# Patient Record
Sex: MALE | Race: WHITE | Employment: PART TIME | URBAN - METROPOLITAN AREA
[De-identification: names, ages, dates, MRNs, and addresses within clinical notes are randomized per-mention and may not be internally consistent; named-entity substitution may affect disease eponyms.]

---
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
## 2022-08-15 ENCOUNTER — HOSPITAL ENCOUNTER (EMERGENCY)
Age: 22
Discharge: HOME | End: 2022-08-15
Payer: COMMERCIAL

## 2022-08-15 VITALS
OXYGEN SATURATION: 100 % | WEIGHT: 200 LBS | SYSTOLIC BLOOD PRESSURE: 158 MMHG | HEART RATE: 103 BPM | TEMPERATURE: 101.1 F | RESPIRATION RATE: 18 BRPM | HEIGHT: 71 IN | BODY MASS INDEX: 28 KG/M2 | DIASTOLIC BLOOD PRESSURE: 90 MMHG

## 2022-08-15 DIAGNOSIS — B27.90 INFECTIOUS MONONUCLEOSIS WITHOUT COMPLICATION, INFECTIOUS MONONUCLEOSIS DUE TO UNSPECIFIED ORGANISM: Primary | ICD-10-CM

## 2022-08-15 LAB
MONO TEST: POSITIVE
SARS-COV-2, NAAT: NOT DETECTED
STREP GRP A PCR: NEGATIVE

## 2022-08-15 PROCEDURE — 36415 COLL VENOUS BLD VENIPUNCTURE: CPT

## 2022-08-15 PROCEDURE — 87880 STREP A ASSAY W/OPTIC: CPT

## 2022-08-15 PROCEDURE — 6370000000 HC RX 637 (ALT 250 FOR IP): Performed by: NURSE PRACTITIONER

## 2022-08-15 PROCEDURE — 99211 OFF/OP EST MAY X REQ PHY/QHP: CPT

## 2022-08-15 PROCEDURE — 86308 HETEROPHILE ANTIBODY SCREEN: CPT

## 2022-08-15 PROCEDURE — 87635 SARS-COV-2 COVID-19 AMP PRB: CPT

## 2022-08-15 RX ORDER — ACETAMINOPHEN 325 MG/1
650 TABLET ORAL EVERY 6 HOURS PRN
COMMUNITY

## 2022-08-15 RX ADMIN — IBUPROFEN 600 MG: 200 TABLET, FILM COATED ORAL at 19:33

## 2022-08-15 ASSESSMENT — PAIN DESCRIPTION - PAIN TYPE
TYPE: ACUTE PAIN
TYPE: ACUTE PAIN

## 2022-08-15 ASSESSMENT — PAIN DESCRIPTION - FREQUENCY
FREQUENCY: CONTINUOUS
FREQUENCY: CONTINUOUS

## 2022-08-15 ASSESSMENT — PAIN SCALES - GENERAL
PAINLEVEL_OUTOF10: 7
PAINLEVEL_OUTOF10: 7
PAINLEVEL_OUTOF10: 5

## 2022-08-15 ASSESSMENT — PAIN DESCRIPTION - LOCATION
LOCATION: THROAT

## 2022-08-15 ASSESSMENT — PAIN DESCRIPTION - DESCRIPTORS
DESCRIPTORS: SORE
DESCRIPTORS: SORE

## 2022-08-15 ASSESSMENT — PAIN DESCRIPTION - ONSET
ONSET: GRADUAL
ONSET: GRADUAL

## 2022-08-15 ASSESSMENT — PAIN - FUNCTIONAL ASSESSMENT: PAIN_FUNCTIONAL_ASSESSMENT: 0-10

## 2022-08-15 NOTE — Clinical Note
Jeni Kerrie was seen and treated in our emergency department on 8/15/2022.     He will be absent from work until cleared by his Dfr    [place] Pamela, APRN - CNP

## 2022-08-15 NOTE — Clinical Note
Mateo Early was seen and treated in our emergency department on 8/15/2022.         Delfin Mishra, ALBERTO - CNP

## 2022-08-16 NOTE — ED PROVIDER NOTES
Department of Emergency . [facility]  Provider Note  Admit Date/RoomTime: 8/15/2022  7:09 PM  Room:   NAME: Tony Haque  : 2000  MRN: [id number]     Chief Complaint:  Pharyngitis, Nasal Congestion, and Cough    History of Present Illness       Tony Haque is a 25 y.o. old male who presents to the urgent care center by private vehicle for evaluation. He has a sore throat, cough, nasal congestion, and body aches. This started yesterday. He has not been around anyone sick. He is not complaining of abdominal pain, chest pain, or shortness of breath. ROS    Pertinent positives and negatives are stated within HPI, all other systems reviewed and are negative. Past Surgical History:   Procedure Laterality Date    KNEE SURGERY     Social History:  reports that he has never smoked. He has never used smokeless tobacco. He reports current alcohol use. He reports that he does not use drugs. Family History: family history is not on file. Allergies: Patient has no known allergies. Physical Exam            ED Triage Vitals [08/15/22 1910]   BP Temp Temp Source Heart Rate Resp SpO2 Height Weight   (!) 158/90 (!) 102.2 °F (39 °C) Infrared (!) 105 18 100 % 5' 11\" (1.803 m) 200 lb (90.7 kg)      Oxygen Saturation Interpretation: Normal.    Constitutional:  Alert, appears ill  Ears:  External Ears: Bilateral normal.               TM's & External Canals: normal appearance, normal TMs bilaterally. Nose:   There is no discharge, swelling or lesions noted. Sinuses: no bilateral maxillary sinus tenderness. no bilateral frontal sinus tenderness. Mouth:  normal tongue and buccal mucosa. Throat: mild erythema. Airway patent. Neck/Lymphatics:  Neck Supple. There is Bilateral  anterior cervical node tenderness. Respiratory:   Breath sounds: bilateral normal, diminished.   Lung sounds: normal.   CV:  Regular rate and rhythm, normal heart sounds, without pathological murmurs, ectopy, gallops, or rubs. GI:  Abdomen Soft, nontender, good bowel sounds. No firm or pulsatile mass. Integument:  Normal turgor. Warm, dry, without visible rash. Neurological:  Oriented. Motor functions intact. Lab / Imaging Results   (All laboratory and radiology results have been personally reviewed by myself)  Labs:  Results for orders placed or performed during the hospital encounter of 08/15/22   Strep Screen Group A Throat    Specimen: Throat   Result Value Ref Range    Strep Grp A PCR Negative Negative   COVID-19, Rapid    Specimen: Nasopharyngeal Swab   Result Value Ref Range    SARS-CoV-2, NAAT Not Detected Not Detected   Mononucleosis Screen   Result Value Ref Range    Mono Test POSITIVE (A) Negative     Imaging: All Radiology results interpreted by Radiologist unless otherwise noted. No orders to display     ED Course / Medical Decision Making     Medications   ibuprofen (ADVIL;MOTRIN) tablet 600 mg (600 mg Oral Given 8/15/22 1933)          MDM:     He was give ibuprofen for his temp--it was 102.2   COvid and strep were negative. He is positive for Mono. He as no abdominal pain or tenderness. Temp recheck was 101.1  I discussed symptomatic treatment for the mono. He was advised to alternate tylenol an dibuprofen for the fever. If abdominal pain he should go directly to the ED. He was given a note to be off work until he was cleared by his Dr. 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   1. Infectious mononucleosis without complication, infectious mononucleosis due to unspecified organism      Plan   Discharge to home and advised to contact María Chadwick MD  [address] [place] [address]  [phone]    Schedule an appointment as soon as possible for a visit      Patient condition is good    New Medications     Discharge Medication List as of 8/15/2022  8:29 PM        Electronically signed by ALBERTO Duarte CNP   DD: 8/16/22  **This report was transcribed using voice recognition software. Every effort was made to ensure accuracy; however, inadvertent computerized transcription errors may be present.   END OF ED PROVIDER NOTE      ALBERTO Melchor - LUIS  08/16/22 [id number]

## 2024-04-02 ENCOUNTER — APPOINTMENT (OUTPATIENT)
Dept: GENERAL RADIOLOGY | Age: 24
End: 2024-04-02
Payer: COMMERCIAL

## 2024-04-02 ENCOUNTER — HOSPITAL ENCOUNTER (EMERGENCY)
Age: 24
Discharge: HOME | End: 2024-04-02
Attending: STUDENT IN AN ORGANIZED HEALTH CARE EDUCATION/TRAINING PROGRAM
Payer: COMMERCIAL

## 2024-04-02 VITALS
OXYGEN SATURATION: 99 % | DIASTOLIC BLOOD PRESSURE: 87 MMHG | WEIGHT: 220 LBS | RESPIRATION RATE: 20 BRPM | SYSTOLIC BLOOD PRESSURE: 135 MMHG | BODY MASS INDEX: 30.68 KG/M2 | TEMPERATURE: 97.7 F | HEART RATE: 90 BPM

## 2024-04-02 DIAGNOSIS — M25.562 ACUTE PAIN OF LEFT KNEE: Primary | ICD-10-CM

## 2024-04-02 PROCEDURE — 96372 THER/PROPH/DIAG INJ SC/IM: CPT

## 2024-04-02 PROCEDURE — 99284 EMERGENCY DEPT VISIT MOD MDM: CPT

## 2024-04-02 PROCEDURE — 6360000002 HC RX W HCPCS

## 2024-04-02 PROCEDURE — 73560 X-RAY EXAM OF KNEE 1 OR 2: CPT

## 2024-04-02 PROCEDURE — 6370000000 HC RX 637 (ALT 250 FOR IP)

## 2024-04-02 RX ORDER — OXYCODONE HYDROCHLORIDE AND ACETAMINOPHEN 5; 325 MG/1; MG/1
1 TABLET ORAL ONCE
Status: COMPLETED | OUTPATIENT
Start: 2024-04-02 | End: 2024-04-02

## 2024-04-02 RX ORDER — OXYCODONE HYDROCHLORIDE AND ACETAMINOPHEN 5; 325 MG/1; MG/1
1 TABLET ORAL EVERY 6 HOURS PRN
Qty: 8 TABLET | Refills: 0 | Status: SHIPPED | OUTPATIENT
Start: 2024-04-02 | End: 2024-04-04

## 2024-04-02 RX ORDER — KETOROLAC TROMETHAMINE 30 MG/ML
30 INJECTION, SOLUTION INTRAMUSCULAR; INTRAVENOUS ONCE
Status: COMPLETED | OUTPATIENT
Start: 2024-04-02 | End: 2024-04-02

## 2024-04-02 RX ADMIN — OXYCODONE AND ACETAMINOPHEN 1 TABLET: 5; 325 TABLET ORAL at 21:52

## 2024-04-02 RX ADMIN — KETOROLAC TROMETHAMINE 30 MG: 30 INJECTION INTRAMUSCULAR; INTRAVENOUS at 21:52

## 2024-04-02 ASSESSMENT — PAIN SCALES - GENERAL: PAINLEVEL_OUTOF10: 7

## 2024-04-03 NOTE — ED PROVIDER NOTES
[facility] EMERGENCY DEPARTMENT  EMERGENCY DEPARTMENT ENCOUNTER        Pt Name: Tk Steward  MRN: [id number]  Birthdate 2000  Date of evaluation: 4/2/2024  Provider: Holden Manuel DO  PCP: No primary care provider on file.  Note Started: 9:44 PM EDT 4/2/24    CHIEF COMPLAINT       Chief Complaint   Patient presents with    Leg Pain     States knee was dislocated while playing basketball, states popped back in place. C/o right knee pain       HISTORY OF PRESENT ILLNESS: 1 or more Elements   History From: PATIENT     Limitations to history : None    Tk Steward is a 23 y.o. male with prior history of ACL tear of the left knee arriving with a complaint of right knee pain.  Patient states that about 20 minutes ago while playing basketball he came down hard on the right leg and felt a crack/pop and severe pain followed.  He has been unable to ambulate on that leg.  He denies any numbness or tingling.  He states that his knee looks laterally displaced directly after the incident but has since then reduced itself.      Nursing Notes were all reviewed and agreed with or any disagreements were addressed in the HPI.    REVIEW OF SYSTEMS :      Review of Systems    POSITIVE (+): joint pain   NEGATIVE (-): fevers, chills, nausea, vomiting, diarrhea, constipation, shortness of breath, chest pain, abdominal pain      SURGICAL HISTORY     Past Surgical History:   Procedure Laterality Date    KNEE SURGERY         CURRENTMEDICATIONS       Discharge Medication List as of 4/2/2024 11:12 PM        CONTINUE these medications which have NOT CHANGED    Details   acetaminophen (TYLENOL) 325 MG tablet Take 650 mg by mouth every 6 hours as needed for PainHistorical Med             ALLERGIES     Patient has no known allergies.    FAMILYHISTORY     History reviewed. No pertinent family history.     SOCIAL HISTORY       Social History     Tobacco Use    Smoking status: Never    Smokeless tobacco:

## 2025-01-13 ENCOUNTER — HOSPITAL ENCOUNTER (EMERGENCY)
Age: 25
Discharge: HOME | End: 2025-01-14
Attending: EMERGENCY MEDICINE
Payer: COMMERCIAL

## 2025-01-13 ENCOUNTER — APPOINTMENT (OUTPATIENT)
Dept: ULTRASOUND IMAGING | Age: 25
End: 2025-01-13
Payer: COMMERCIAL

## 2025-01-13 ENCOUNTER — APPOINTMENT (OUTPATIENT)
Dept: GENERAL RADIOLOGY | Age: 25
End: 2025-01-13
Payer: COMMERCIAL

## 2025-01-13 VITALS
OXYGEN SATURATION: 99 % | HEART RATE: 106 BPM | BODY MASS INDEX: 32.08 KG/M2 | DIASTOLIC BLOOD PRESSURE: 106 MMHG | SYSTOLIC BLOOD PRESSURE: 157 MMHG | WEIGHT: 230 LBS | RESPIRATION RATE: 20 BRPM | TEMPERATURE: 98.3 F

## 2025-01-13 DIAGNOSIS — G89.18 POST-OPERATIVE PAIN: ICD-10-CM

## 2025-01-13 DIAGNOSIS — R21 RASH: Primary | ICD-10-CM

## 2025-01-13 LAB
ANION GAP SERPL CALCULATED.3IONS-SCNC: 11 MMOL/L (ref 7–16)
BUN SERPL-MCNC: 20 MG/DL (ref 6–20)
CALCIUM SERPL-MCNC: 10.4 MG/DL (ref 8.6–10.2)
CHLORIDE SERPL-SCNC: 98 MMOL/L (ref 98–107)
CO2 SERPL-SCNC: 28 MMOL/L (ref 22–29)
CREAT SERPL-MCNC: 1 MG/DL (ref 0.7–1.2)
ERYTHROCYTE [DISTWIDTH] IN BLOOD BY AUTOMATED COUNT: 11.9 % (ref 11.5–15)
GFR, ESTIMATED: >90 ML/MIN/1.73M2
GLUCOSE SERPL-MCNC: 102 MG/DL (ref 74–99)
HCT VFR BLD AUTO: 44.2 % (ref 37–54)
HGB BLD-MCNC: 15.6 G/DL (ref 12.5–16.5)
LACTATE BLDV-SCNC: 1 MMOL/L (ref 0.5–1.9)
MCH RBC QN AUTO: 30.7 PG (ref 26–35)
MCHC RBC AUTO-ENTMCNC: 35.3 G/DL (ref 32–34.5)
MCV RBC AUTO: 87 FL (ref 80–99.9)
PLATELET # BLD AUTO: 298 K/UL (ref 130–450)
PMV BLD AUTO: 9.2 FL (ref 7–12)
POTASSIUM SERPL-SCNC: 4.1 MMOL/L (ref 3.5–5)
RBC # BLD AUTO: 5.08 M/UL (ref 3.8–5.8)
SODIUM SERPL-SCNC: 137 MMOL/L (ref 132–146)
WBC OTHER # BLD: 10 K/UL (ref 4.5–11.5)

## 2025-01-13 PROCEDURE — 83605 ASSAY OF LACTIC ACID: CPT

## 2025-01-13 PROCEDURE — 6360000002 HC RX W HCPCS

## 2025-01-13 PROCEDURE — 99284 EMERGENCY DEPT VISIT MOD MDM: CPT

## 2025-01-13 PROCEDURE — 87040 BLOOD CULTURE FOR BACTERIA: CPT

## 2025-01-13 PROCEDURE — 96375 TX/PRO/DX INJ NEW DRUG ADDON: CPT

## 2025-01-13 PROCEDURE — 96374 THER/PROPH/DIAG INJ IV PUSH: CPT

## 2025-01-13 PROCEDURE — 2500000003 HC RX 250 WO HCPCS

## 2025-01-13 PROCEDURE — 93971 EXTREMITY STUDY: CPT

## 2025-01-13 PROCEDURE — 73560 X-RAY EXAM OF KNEE 1 OR 2: CPT

## 2025-01-13 PROCEDURE — 80048 BASIC METABOLIC PNL TOTAL CA: CPT

## 2025-01-13 PROCEDURE — 85027 COMPLETE CBC AUTOMATED: CPT

## 2025-01-13 RX ORDER — OXYCODONE AND ACETAMINOPHEN 5; 325 MG/1; MG/1
1 TABLET ORAL EVERY 4 HOURS PRN
COMMUNITY

## 2025-01-13 RX ORDER — DEXAMETHASONE SODIUM PHOSPHATE 10 MG/ML
10 INJECTION INTRAMUSCULAR; INTRAVENOUS ONCE
Status: COMPLETED | OUTPATIENT
Start: 2025-01-13 | End: 2025-01-13

## 2025-01-13 RX ADMIN — DEXAMETHASONE SODIUM PHOSPHATE 10 MG: 10 INJECTION INTRAMUSCULAR; INTRAVENOUS at 22:10

## 2025-01-13 RX ADMIN — WATER 2000 MG: 1 INJECTION INTRAMUSCULAR; INTRAVENOUS; SUBCUTANEOUS at 20:48

## 2025-01-13 ASSESSMENT — LIFESTYLE VARIABLES
HOW OFTEN DO YOU HAVE A DRINK CONTAINING ALCOHOL: NEVER
HOW MANY STANDARD DRINKS CONTAINING ALCOHOL DO YOU HAVE ON A TYPICAL DAY: PATIENT DOES NOT DRINK

## 2025-01-14 NOTE — ED PROVIDER NOTES
[facility] EMERGENCY DEPARTMENT  EMERGENCY DEPARTMENT ENCOUNTER        Pt Name: Tk Steward  MRN: [id number]  Birthdate 2000  Date of evaluation: 1/13/2025  Provider: Holden Manuel DO  PCP: Awadalla, Bahaa A, MD  Note Started: 7:53 PM EST 1/13/25    CHIEF COMPLAINT       Chief Complaint   Patient presents with    Post-op Problem     Right knee acl repair on 1/8, c/o swelling and hot to touch right knee as well as petichiae to area and hot flashes/chills       HISTORY OF PRESENT ILLNESS: 1 or more Elements   History From: PATIENT     Limitations to history : None    Tk Steward is a 24 y.o. male arriving with complaint of rash, swelling, warmth and pain to the right knee. He had an ACL repair by Dr. Rivas at [facility] on 1/7/25. Rash developed a few days ago and he wonders if he had a reaction to their surgery prep.  He denies fevers.      Nursing Notes were all reviewed and agreed with or any disagreements were addressed in the HPI.    REVIEW OF SYSTEMS :      Review of Systems    POSITIVE (+): Rash, joint swelling  NEGATIVE (-): Fevers, chills, nausea, vomiting, diarrhea, constipation,    SURGICAL HISTORY     Past Surgical History:   Procedure Laterality Date    KNEE SURGERY         CURRENTMEDICATIONS       Discharge Medication List as of 1/14/2025 12:55 AM        CONTINUE these medications which have NOT CHANGED    Details   oxyCODONE-acetaminophen (PERCOCET) 5-325 MG per tablet Take 1 tablet by mouth every 4 hours as needed for Pain. Max Daily Amount: 6 tabletsHistorical Med      Ibuprofen (MOTRIN PO) Take by mouthHistorical Med             ALLERGIES     Patient has no known allergies.    FAMILYHISTORY     No family history on file.     SOCIAL HISTORY       Social History     Tobacco Use    Smoking status: Never    Smokeless tobacco: Never   Vaping Use    Vaping status: Never Used   Substance Use Topics    Alcohol use: Yes     Comment: socially    Drug use: Never

## 2025-01-14 NOTE — DISCHARGE INSTRUCTIONS
Call your orthopedic doctor in the morning and make an appointment  Return if you get a fever or severe pain or drainage from your wound

## 2025-01-19 LAB
MICROORGANISM SPEC CULT: NORMAL
MICROORGANISM SPEC CULT: NORMAL
SERVICE CMNT-IMP: NORMAL
SERVICE CMNT-IMP: NORMAL
SPECIMEN DESCRIPTION: NORMAL
SPECIMEN DESCRIPTION: NORMAL